# Patient Record
Sex: FEMALE | Race: WHITE | Employment: FULL TIME | URBAN - METROPOLITAN AREA
[De-identification: names, ages, dates, MRNs, and addresses within clinical notes are randomized per-mention and may not be internally consistent; named-entity substitution may affect disease eponyms.]

---

## 2021-10-20 ENCOUNTER — OFFICE VISIT (OUTPATIENT)
Dept: SURGERY | Age: 41
End: 2021-10-20
Payer: COMMERCIAL

## 2021-10-20 ENCOUNTER — HOSPITAL ENCOUNTER (OUTPATIENT)
Dept: LAB | Age: 41
Discharge: HOME | End: 2021-10-20

## 2021-10-20 VITALS
HEART RATE: 92 BPM | HEIGHT: 68 IN | BODY MASS INDEX: 23.34 KG/M2 | SYSTOLIC BLOOD PRESSURE: 113 MMHG | WEIGHT: 154 LBS | DIASTOLIC BLOOD PRESSURE: 71 MMHG

## 2021-10-20 DIAGNOSIS — D24.1 FIBROADENOMA OF BREAST, RIGHT: ICD-10-CM

## 2021-10-20 DIAGNOSIS — R92.8 ABNORMAL ULTRASOUND OF BREAST: ICD-10-CM

## 2021-10-20 DIAGNOSIS — N63.20 BREAST MASS, LEFT: Primary | ICD-10-CM

## 2021-10-20 DIAGNOSIS — D48.62 BORDERLINE FINDING OF PHYLLODES NEOPLASM OF BREAST, LEFT: ICD-10-CM

## 2021-10-20 DIAGNOSIS — R89.7 ABNORMAL BIOPSY RESULT: ICD-10-CM

## 2021-10-20 PROCEDURE — 76642 ULTRASOUND BREAST LIMITED: CPT | Performed by: SURGERY

## 2021-10-20 PROCEDURE — 19083 BX BREAST 1ST LESION US IMAG: CPT | Performed by: SURGERY

## 2021-10-20 NOTE — LETTER
10/22/2021 10:57 AM    Patient:  Ishaan Rogel   YOB: 1980  Date of Visit: 10/20/2021      Dear Cat: Thank you for referring Ms. Ishaan Rogel to me for evaluation/treatment. Below are the relevant portions of my assessment and plan of care. If you have questions, please do not hesitate to call me. I look forward to following Ms. Walsh along with you.         Sincerely,    Maday Hines MD

## 2021-10-20 NOTE — PROGRESS NOTES
HISTORY OF PRESENT ILLNESS  Eveline Alejandro is a 42 y.o. female. HPI  NEW patient consult referred by Kody Nolasco NP at [facility], for LEFT breast pain and discomfort. 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 She noticed LEFT nipple pain about a year ago, and the surgeon she saw thought it was related to a \"ripple\" or \"seam\" in the implant. Pt states she \"stopped sensing it\" after that. Pt also notes a RIGHT breast mass.     History:  10/2020 - RIGHT breast stereotactic biopsy, fibroadenoma. 5/13/2019 - LEFT mastectomy for borderline phyllodes tumor, with implant reconstruction. [place]. Family History: none      No past medical history on file. Past Surgical History:   Procedure Laterality Date    HX BREAST LUMPECTOMY  1995    bx. right breast fibroadenoma       Social History     Socioeconomic History    Marital status:      Spouse name: Not on file    Number of children: Not on file    Years of education: Not on file    Highest education level: Not on file   Occupational History    Not on file   Tobacco Use    Smoking status: Never Smoker    Smokeless tobacco: Never Used   Substance and Sexual Activity    Alcohol use: Yes     Alcohol/week: 4.2 standard drinks     Types: 2 Glasses of wine, 3 Cans of beer per week    Drug use: No    Sexual activity: Yes     Partners: Male     Birth control/protection: Condom   Other Topics Concern    Not on file   Social History Narrative    Not on file     Social Determinants of Health     Financial Resource Strain:     Difficulty of Paying Living Expenses:    Food Insecurity:     Worried About Running Out of Food in the Last Year:     [address] in the Last Year:    Transportation Needs:     Lack of Transportation (Medical):      Lack of Transportation (Non-Medical):    Physical Activity:     Days of Exercise per Week:     Minutes of Exercise per Session:    Stress:     Feeling of Stress :    Social Connections:     Frequency of Communication with Friends and Family:     Frequency of Social Gatherings with Friends and Family:     Attends Sikhism Services:     Active Member of Clubs or Organizations:     Attends Club or Organization Meetings:     Marital Status:    Intimate Partner Violence:     Fear of Current or Ex-Partner:     Emotionally Abused:     Physically Abused:     Sexually Abused:        No current outpatient medications on file prior to visit. No current facility-administered medications on file prior to visit. Not on File    OB History    No obstetric history on file. Obstetric Comments   Menarche 14, LMP current, # of children 2, age of 4st delivery 26, Hysterectomy/oophorectomy no/no, Breast bx yes, history of breast feeding yes, BCP yes, Hormone therapy no             Review of Systems   Constitutional: Negative. HENT: Negative. Eyes: Negative. Respiratory: Negative. Cardiovascular: Negative. Gastrointestinal: Negative. Genitourinary: Negative. Musculoskeletal: Negative. Skin: Negative. Neurological: Negative. Endo/Heme/Allergies: Negative. Psychiatric/Behavioral: Negative. Physical Exam  Exam conducted with a chaperone present. Cardiovascular:      Rate and Rhythm: Normal rate and regular rhythm. Heart sounds: Normal heart sounds. Pulmonary:      Breath sounds: Normal breath sounds. Chest:      Breasts: Breasts are symmetrical.         Right: Mass present. No swelling, bleeding, inverted nipple, nipple discharge, skin change or tenderness. Left: Mass present. No swelling, bleeding, inverted nipple, nipple discharge, skin change or tenderness. Lymphadenopathy:      Cervical:      Right cervical: No superficial, deep or posterior cervical adenopathy. Left cervical: No superficial, deep or posterior cervical adenopathy. Upper Body:      Right upper body: No supraclavicular or axillary adenopathy. Left upper body: No supraclavicular or axillary adenopathy. BREAST ULTRASOUND  Indication: BILATERAL breast masses. LEFT - 12:00, periareolar. RIGHT - 6:00. Technique: The area was scanned using a high-frequency linear-array near-field transducer  Findings: LEFT -  intact implant, with irregular hypoechoic mass above the implant. RIGHT - solid mass, no bx clip. Impression: LEFT - suspicious mass. RIGHT - concordant bx PATH (fibroadenoma). Disposition: LEFT ultrasound-guided biopsy performed      US-GUIDED CORE BIOPSY  Following detailed explanation and description of the biopsy procedure, its risks, benefits and possible alternatives, the patient signed the informed consent. Indication: Mass, Ultrasound Visible, LEFT breast 12:00, periareolar   Prep: We cleansed the skin with alcohol. Anesthesia: We anesthetized the skin and underlying tissues with 1% lidocaine with epinephrine. Device: We advanced the BARD Marquee device through the lesion and captured tissue with real-time ultrasound confirmation. Core Sampling: We repeated this sampling for the following number of cores, 3. Marker: We placed a marking clip to maximo the biopsy site. Marker Type: HydroMARK. Dressing: We then closed the incision with steristrips and placed a sterile dressing. Instructions: The patient was instructed regarding post-procedure care and activities. Pathology: Pending at this time. Patient tolerated procedure well and discharged in stable condition. Informed patient that they will be notified of pathology results in 3 to 5 days. ASSESSMENT and PLAN    ICD-10-CM ICD-9-CM    1. Breast mass, left  N63.20 611.72 SURGICAL PATHOLOGY   2. Abnormal ultrasound of breast  R92.8 793.89    3.  Fibroadenoma of breast, right  D24.1 1       New patient presents to establish care with a breast surgeon, and for evaluation of BL breast masses, and is doing well overall. Hypertrophic scar laterally in the LEFT breast s/p mastectomy with implant reconstruction, and palpable mass at 12:00 periareolar. US visualizes intact implant, with irregular hypoechoic mass above the implant at the 12:00 location. Discussed bx of this mass and pt agreed to proceed. She tolerated the procedure well, and 3 cores were taken, [facility] marker placed. Palpable mass on exam at RIGHT breast 6:00. US visualizes solid mass, no bx clip. Reviewed PATH report (pt had on her phone), which demonstrated fibroadenoma. Concordant bx results and US findings, no further intervention necessary. Will send LEFT breast bx specimens for PATH and f/u with results. This plan was reviewed with the patient and patient agrees. All questions were answered. Addendum:  Biopsy results are nonconcordant on left. Will get MRI given history of borderline phyllodes tumor    Total time spent was 40 minutes.     Written by Enrique Fletcher, as dictated by Dr. Stefan Duarte MD.

## 2021-10-20 NOTE — PATIENT INSTRUCTIONS
Ultrasound-Guided Breast Biopsy: About This Test  What is it? A breast biopsy removes a sample of breast tissue that is looked at under a microscope to check for breast cancer. Ultrasound is used to show an image of the breast tissue during the biopsy. This is called an ultrasound-guided breast biopsy. Why is this test done? A breast biopsy is most often done to check a lump found during a breast exam or a suspicious area found on a mammogram or other imaging. If there is a good chance that your doctor can get a sample without doing an open (surgical) biopsy, you can have a needle biopsy instead. For a needle breast biopsy, your doctor uses a needle to take a small sample of fluid or cells from the breast for testing. When the biopsy area isn't easy to find, the breast biopsy needle is usually guided with ultrasound. An ultrasound uses sound waves to make a picture of the inside of the breast. The sound waves create a picture on a video monitor. How do you prepare for the test?  If you take aspirin or some other blood thinner, ask your doctor if you should stop taking it before your test. Make sure that you understand exactly what your doctor wants you to do. These medicines increase the risk of bleeding. How is the test done? Ultrasound is used to guide the placement of the needle during the biopsy. · A warm gel will be spread on your breast.  · The ultrasound wand is pressed against your skin and gently moved around to find the place where the mammogram showed a problem. A picture of the breast can be seen on a video monitor. · A needle or tiny probe is put through your skin into your breast tissue. · If the lump is a cyst, the needle will take out fluid. If the lump is solid, the needle will take a sample of tissue. · The needle is removed and pressure is put on the needle site to stop any bleeding. The area is covered with a bandage. How does the test feel?   · Ultrasound is painless and does not use radiation. · You will feel only a quick sting from the needle if you have a local anesthetic to numb the biopsy area. You may feel some pressure when the biopsy needle is put in. How long does the test take? The test usually takes about 15 minutes. This depends on how many biopsy samples are needed. What happens after the test?  · You'll be told how long it may take to get your results back. · You will probably be able to go home right away. · After a specialist looks at the biopsy sample for signs of cancer, your doctor's office will let you know the results. · If the test results aren't clear, you may have another biopsy or test.  How can you care for yourself at home? · You can go back to your usual activities right away. But avoid heavy lifting for 24 hours. · The site may be tender for 2 or 3 days. You may also have some bruising, swelling, or slight bleeding. ? You can use an ice pack. Put ice or a cold pack on the area for 10 to 20 minutes at a time. Put a thin cloth between the ice and your skin. ? Ask your doctor if you can take an over-the-counter pain medicine, such as acetaminophen (Tylenol), ibuprofen (Advil, Motrin), or naproxen (Aleve). Be safe with medicines. Read and follow all instructions on the label. Follow-up care is a key part of your treatment and safety. Be sure to make and go to all appointments, and call your doctor if you are having problems. It's also a good idea to keep a list of the medicines you take. Ask your doctor when you can expect to have your test results. Where can you learn more? Go to http://www.gray.com/  Enter N959 in the search box to learn more about \"Ultrasound-Guided Breast Biopsy: About This Test.\"  Current as of: December 17, 2020               Content Version: 13.0  © 0281-7324 Healthwise, Incorporated.    Care instructions adapted under license by [employer] (which disclaims liability or warranty for this information). If you have questions about a medical condition or this instruction, always ask your healthcare professional. [place] [address] any warranty or liability for your use of this information.

## 2021-10-20 NOTE — PROGRESS NOTES
HISTORY OF PRESENT ILLNESS  Awa Holly is a 42 y.o. female. HPI NEW patient consult referred by PA-OB/GYN Viji Medeiros [facility],for LEFT breast pain/discomfort and lump in axillary area. Patient had Left breast mastectomy w/implant. Family History: none      Mammogram, ***, BIRADS ***  history  10/2020-Had RIGHT breast stereotactic biopsy   5/13/2019 Mastectomy  LEFT breast w/ implant     Review of Systems   All other systems reviewed and are negative.       Physical Exam    ASSESSMENT and PLAN  {ASSESSMENT/PLAN:26723}

## 2021-10-25 ENCOUNTER — TELEPHONE (OUTPATIENT)
Dept: SURGERY | Age: 41
End: 2021-10-25

## 2021-10-25 DIAGNOSIS — D48.62 PHYLLODES TUMOR, LEFT: Primary | ICD-10-CM

## 2021-12-14 ENCOUNTER — DOCUMENTATION ONLY (OUTPATIENT)
Dept: SURGERY | Age: 41
End: 2021-12-14

## 2021-12-14 NOTE — PROGRESS NOTES
[facility] from the billing ([phone]) department called and said that [address] denied the breast MRI. They flatly denied and said they will not pay for it. We will not be able to do a peer to peer.

## 2021-12-15 ENCOUNTER — TELEPHONE (OUTPATIENT)
Dept: SURGERY | Age: 41
End: 2021-12-15

## 2021-12-15 NOTE — TELEPHONE ENCOUNTER
Called patient with note that insurance denied MRI and we could not do a peer to peer. I have never heard of that and she has prepaid some of this already. She will call insurance and check into this.

## 2021-12-27 ENCOUNTER — HOSPITAL ENCOUNTER (OUTPATIENT)
Dept: MRI IMAGING | Age: 41
End: 2021-12-27
Attending: SURGERY

## 2022-01-17 ENCOUNTER — HOSPITAL ENCOUNTER (OUTPATIENT)
Dept: MRI IMAGING | Age: 42
Discharge: HOME | End: 2022-01-17
Attending: SURGERY
Payer: COMMERCIAL

## 2022-01-17 DIAGNOSIS — R89.7 ABNORMAL BIOPSY RESULT: ICD-10-CM

## 2022-01-17 DIAGNOSIS — D48.62 BORDERLINE FINDING OF PHYLLODES NEOPLASM OF BREAST, LEFT: ICD-10-CM

## 2022-01-17 DIAGNOSIS — N63.20 BREAST MASS, LEFT: ICD-10-CM

## 2022-01-17 DIAGNOSIS — R92.8 ABNORMAL ULTRASOUND OF BREAST: ICD-10-CM

## 2022-01-17 PROCEDURE — 74011000258 HC RX REV CODE- 258: Performed by: SURGERY

## 2022-01-17 PROCEDURE — 74011250636 HC RX REV CODE- 250/636

## 2022-01-17 PROCEDURE — A9576 INJ PROHANCE MULTIPACK: HCPCS

## 2022-01-17 PROCEDURE — 74011000250 HC RX REV CODE- 250: Performed by: SURGERY

## 2022-01-17 PROCEDURE — 77049 MRI BREAST C-+ W/CAD BI: CPT

## 2022-01-17 RX ORDER — SODIUM CHLORIDE 0.9 % (FLUSH) 0.9 %
10 SYRINGE (ML) INJECTION
Status: COMPLETED | OUTPATIENT
Start: 2022-01-17 | End: 2022-01-17

## 2022-01-17 RX ADMIN — SODIUM CHLORIDE, PRESERVATIVE FREE 10 ML: 5 INJECTION INTRAVENOUS at 15:00

## 2022-01-17 RX ADMIN — GADOTERIDOL 15 ML: 279.3 INJECTION, SOLUTION INTRAVENOUS at 15:00

## 2022-01-17 RX ADMIN — SODIUM CHLORIDE 100 ML: 900 INJECTION, SOLUTION INTRAVENOUS at 15:00

## 2022-01-18 ENCOUNTER — TELEPHONE (OUTPATIENT)
Dept: SURGERY | Age: 42
End: 2022-01-18

## 2022-01-27 ENCOUNTER — APPOINTMENT (OUTPATIENT)
Dept: MAMMOGRAPHY | Age: 42
End: 2022-01-27
Attending: SURGERY

## 2022-01-27 ENCOUNTER — APPOINTMENT (OUTPATIENT)
Dept: ULTRASOUND IMAGING | Age: 42
End: 2022-01-27
Attending: SURGERY

## 2022-02-04 ENCOUNTER — HOSPITAL ENCOUNTER (OUTPATIENT)
Dept: MAMMOGRAPHY | Age: 42
Discharge: HOME | End: 2022-02-04
Attending: SURGERY
Payer: COMMERCIAL

## 2022-02-04 DIAGNOSIS — R92.8 ABNORMAL MRI, BREAST: ICD-10-CM

## 2022-02-04 PROCEDURE — 77062 BREAST TOMOSYNTHESIS BI: CPT

## 2022-02-04 PROCEDURE — 76642 ULTRASOUND BREAST LIMITED: CPT

## 2022-02-04 PROCEDURE — 77061 BREAST TOMOSYNTHESIS UNI: CPT

## 2023-03-14 ENCOUNTER — OFFICE VISIT (OUTPATIENT)
Dept: FAMILY MEDICINE CLINIC | Age: 43
End: 2023-03-14
Payer: COMMERCIAL

## 2023-03-14 VITALS
TEMPERATURE: 97.7 F | RESPIRATION RATE: 16 BRPM | OXYGEN SATURATION: 99 % | WEIGHT: 155.4 LBS | BODY MASS INDEX: 23.55 KG/M2 | HEART RATE: 66 BPM | HEIGHT: 68 IN | DIASTOLIC BLOOD PRESSURE: 67 MMHG | SYSTOLIC BLOOD PRESSURE: 105 MMHG

## 2023-03-14 DIAGNOSIS — Z11.59 ENCOUNTER FOR HEPATITIS C SCREENING TEST FOR LOW RISK PATIENT: ICD-10-CM

## 2023-03-14 DIAGNOSIS — Z13.220 SCREENING, LIPID: ICD-10-CM

## 2023-03-14 DIAGNOSIS — Z00.00 ROUTINE GENERAL MEDICAL EXAMINATION AT A HEALTH CARE FACILITY: Primary | ICD-10-CM

## 2023-03-14 PROCEDURE — 99386 PREV VISIT NEW AGE 40-64: CPT | Performed by: NURSE PRACTITIONER

## 2023-03-14 RX ORDER — LEVONORGESTREL 52 MG/1
INTRAUTERINE DEVICE INTRAUTERINE
COMMUNITY
Start: 2021-11-17

## 2023-03-14 RX ORDER — CETIRIZINE HYDROCHLORIDE 10 MG/1
TABLET ORAL
COMMUNITY

## 2023-03-14 RX ORDER — IBUPROFEN 600 MG/1
TABLET ORAL
COMMUNITY
Start: 2022-12-22

## 2023-03-14 NOTE — PROGRESS NOTES
Chief Complaint   Patient presents with    Establish Care       1. Have you been to the ER, urgent care clinic since your last visit? Hospitalized since your last visit? No    2. Have you seen or consulted any other health care providers outside of the [address] [place] [address] since your last visit? Include any pap smears or colon screening. Yes When: 1/2023 Where: [facility]    3. For patients over 45: Has the patient had a colonoscopy? NA - based on age     If the patient is female:    4. For patients over 40: Has the patient had a mammogram? Yes - no Care Gap present    5. For patients over 21: Has the patient had a pap smear? Yes - Care Gap present. Rooming MA/LPN to request most recent results, [facility]    No flowsheet data found. Learning Assessment 10/20/2021   PRIMARY LEARNER Patient   PRIMARY LANGUAGE ENGLISH   LEARNER PREFERENCE PRIMARY DEMONSTRATION   ANSWERED BY patient   RELATIONSHIP SELF       Abuse Screening Questionnaire 3/14/2023   Do you ever feel afraid of your partner? N   Are you in a relationship with someone who physically or mentally threatens you? N   Is it safe for you to go home?  Y     Health Maintenance Due   Topic Date Due    Hepatitis C Screening  Never done    Depression Screen  Never done    DTaP/Tdap/Td series (1 - Tdap) Never done    Cervical cancer screen  Never done    Lipid Screen  Never done    COVID-19 Vaccine (2 - Pfizer series) 07/22/2021    Flu Vaccine (1) Never done

## 2023-03-14 NOTE — PROGRESS NOTES
Assessment/ Plan:   Full age appropriate History and Physical exam as well as health care maintenance  performed and discussed today. Risk factor modification discussed today includes safe sex practices, healthy diet and exercise, and seat belt use. Continue current medications. Diagnoses and all orders for this visit:    1. Routine general medical examination at a health care facility  -     CBC WITH AUTOMATED DIFF; Future  -     METABOLIC PANEL, COMPREHENSIVE; Future  -     TSH 3RD GENERATION; Future    2. Screening, lipid  -     LIPID PANEL; Future    3. Encounter for hepatitis C screening test for low risk patient  -     HEPATITIS C AB; Future      Follow-up and Dispositions    Return if symptoms worsen or fail to improve. Discussed expected course/resolution/complications of diagnosis in detail with patient. Medication risks/benefits/costs/interactions/alternatives discussed with patient. Pt was given after visit summary which includes diagnoses, current medications & vitals. Pt expressed understanding with the diagnosis and plan      HPI:   Uriel Kebede is a 39 y.o. female who presents for annual exam and to establish care. [place] many [place] and now in [place] for the past year. She is a teacher. She is . She has two children. She is followed by [address] [place] [address]. She is up to date on mammogram and pap smear. She plans to initiate marital counseling through her EPA soon. She reports a healthy diet. She is routinely active. Current Outpatient Medications   Medication Sig Dispense Refill    levonorgestreL (Mirena) 21 mcg/24 hours (8 yrs) 52 mg IUD Mirena 20 mcg/24 hours (7 yrs) 52 mg intrauterine device   Take by intrauterine route. ibuprofen (MOTRIN) 600 mg tablet TAKE 1 TABLET BY MOUTH EVERY 6 HOURS AS NEEDED      cetirizine (ZyrTEC) 10 mg tablet Take  by mouth.         Not on File   There is no problem list on file for this patient. History reviewed. No pertinent past medical history. Past Surgical History:   Procedure Laterality Date    HX BREAST BIOPSY      HX BREAST LUMPECTOMY        No LMP recorded (lmp unknown). Family History   Problem Relation Age of Onset    Lung Cancer Mother 64        smoker    Heart Attack Father 59    Diabetes Paternal Grandmother     Heart Attack Paternal Grandmother     Kidney Disease Paternal Grandmother     Heart Attack Paternal Grandfather     Lung Cancer Maternal Uncle         smoker    No Known Problems Son     No Known Problems Daughter       Social History     Socioeconomic History    Marital status:      Spouse name: Not on file    Number of children: Not on file    Years of education: Not on file    Highest education level: Not on file   Occupational History    Not on file   Tobacco Use    Smoking status: Never    Smokeless tobacco: Never   Substance and Sexual Activity    Alcohol use: Yes     Alcohol/week: 4.2 standard drinks     Types: 2 Glasses of wine, 3 Cans of beer per week    Drug use: No    Sexual activity: Yes     Partners: Male     Birth control/protection: I.U.D.    Other Topics Concern    Not on file   Social History Narrative    Not on file     Social Determinants of Health     Financial Resource Strain: Low Risk     Difficulty of Paying Living Expenses: Not hard at all   Food Insecurity: No Food Insecurity    Worried About Running Out of Food in the Last Year: Never true    Ran Out of Food in the Last Year: Never true   Transportation Needs: Not on file   Physical Activity: Sufficiently Active    Days of Exercise per Week: 5 days    Minutes of Exercise per Session: 30 min   Stress: Not on file   Social Connections: Not on file   Intimate Partner Violence: Not At Risk    Fear of Current or Ex-Partner: No    Emotionally Abused: No    Physically Abused: No    Sexually Abused: No   Housing Stability: Not on file        ROS:     Review of Systems Constitutional:  Negative for fever, malaise/fatigue and weight loss. HENT:  Negative for hearing loss. Eyes:  Negative for blurred vision and pain. Respiratory:  Negative for cough and shortness of breath. Cardiovascular:  Negative for chest pain, palpitations and leg swelling. Gastrointestinal:  Negative for abdominal pain, blood in stool, constipation, diarrhea and melena. Genitourinary:  Negative for dysuria and hematuria. Musculoskeletal:  Negative for joint pain. Skin:  Negative for rash. Neurological:  Negative for headaches. Psychiatric/Behavioral:  Negative for depression. The patient is not nervous/anxious and does not have insomnia. Physical Exam:   Visit Vitals  /67 (BP 1 Location: Left upper arm, BP Patient Position: Sitting, BP Cuff Size: Large adult)   Pulse 66   Temp 97.7 °F (36.5 °C)   Resp 16   Ht 5' 8\" (1.727 m)   Wt 155 lb 6.4 oz (70.5 kg)   LMP  (LMP Unknown)   SpO2 99%   BMI 23.63 kg/m²        Nursing Documentation and Vital Signs Reviewed. Physical Exam  Constitutional:       General: She is not in acute distress. HENT:      Right Ear: No drainage. No middle ear effusion. Tympanic membrane is not injected, erythematous or bulging. Left Ear: No drainage. No middle ear effusion. Tympanic membrane is not injected, erythematous or bulging. Eyes:      Pupils: Pupils are equal, round, and reactive to light. Neck:      Trachea: No tracheal deviation. Cardiovascular:      Pulses:           Dorsalis pedis pulses are 2+ on the right side and 2+ on the left side. Posterior tibial pulses are 2+ on the right side and 2+ on the left side. Heart sounds: S1 normal and S2 normal. No murmur heard. No friction rub. No gallop. Pulmonary:      Breath sounds: Normal breath sounds. No wheezing. Abdominal:      General: Bowel sounds are normal. There is no distension. Palpations: Abdomen is soft. There is no mass. Tenderness:  There is no abdominal tenderness. Lymphadenopathy:      Cervical: No cervical adenopathy. Skin:     General: Skin is warm and dry. Neurological:      Cranial Nerves: No cranial nerve deficit. Sensory: Sensation is intact. Motor: Motor function is intact.

## 2023-03-15 LAB
ALBUMIN SERPL-MCNC: 5 G/DL (ref 3.8–4.8)
ALBUMIN/GLOB SERPL: 2.3 {RATIO} (ref 1.2–2.2)
ALP SERPL-CCNC: 89 IU/L (ref 44–121)
ALT SERPL-CCNC: 17 IU/L (ref 0–32)
AST SERPL-CCNC: 19 IU/L (ref 0–40)
BASOPHILS # BLD AUTO: 0 X10E3/UL (ref 0–0.2)
BASOPHILS NFR BLD AUTO: 1 %
BILIRUB SERPL-MCNC: 0.4 MG/DL (ref 0–1.2)
BUN SERPL-MCNC: 7 MG/DL (ref 6–24)
BUN/CREAT SERPL: 10 (ref 9–23)
CALCIUM SERPL-MCNC: 9.5 MG/DL (ref 8.7–10.2)
CHLORIDE SERPL-SCNC: 104 MMOL/L (ref 96–106)
CHOLEST SERPL-MCNC: 199 MG/DL (ref 100–199)
CO2 SERPL-SCNC: 27 MMOL/L (ref 20–29)
CREAT SERPL-MCNC: 0.71 MG/DL (ref 0.57–1)
EGFRCR SERPLBLD CKD-EPI 2021: 107 ML/MIN/1.73
EOSINOPHIL # BLD AUTO: 0.1 X10E3/UL (ref 0–0.4)
EOSINOPHIL NFR BLD AUTO: 2 %
ERYTHROCYTE [DISTWIDTH] IN BLOOD BY AUTOMATED COUNT: 12.3 % (ref 11.7–15.4)
GLOBULIN SER CALC-MCNC: 2.2 G/DL (ref 1.5–4.5)
GLUCOSE SERPL-MCNC: 90 MG/DL (ref 70–99)
HCT VFR BLD AUTO: 42.1 % (ref 34–46.6)
HCV IGG SERPL QL IA: NON REACTIVE
HDLC SERPL-MCNC: 47 MG/DL
HGB BLD-MCNC: 13.8 G/DL (ref 11.1–15.9)
IMM GRANULOCYTES # BLD AUTO: 0 X10E3/UL (ref 0–0.1)
IMM GRANULOCYTES NFR BLD AUTO: 0 %
IMP & REVIEW OF LAB RESULTS: NORMAL
LDLC SERPL CALC-MCNC: 134 MG/DL (ref 0–99)
LYMPHOCYTES # BLD AUTO: 1.4 X10E3/UL (ref 0.7–3.1)
LYMPHOCYTES NFR BLD AUTO: 25 %
MCH RBC QN AUTO: 31.2 PG (ref 26.6–33)
MCHC RBC AUTO-ENTMCNC: 32.8 G/DL (ref 31.5–35.7)
MCV RBC AUTO: 95 FL (ref 79–97)
MONOCYTES # BLD AUTO: 0.4 X10E3/UL (ref 0.1–0.9)
MONOCYTES NFR BLD AUTO: 6 %
NEUTROPHILS # BLD AUTO: 3.6 X10E3/UL (ref 1.4–7)
NEUTROPHILS NFR BLD AUTO: 66 %
PLATELET # BLD AUTO: 297 X10E3/UL (ref 150–450)
POTASSIUM SERPL-SCNC: 4.8 MMOL/L (ref 3.5–5.2)
PROT SERPL-MCNC: 7.2 G/DL (ref 6–8.5)
RBC # BLD AUTO: 4.43 X10E6/UL (ref 3.77–5.28)
SODIUM SERPL-SCNC: 147 MMOL/L (ref 134–144)
TRIGL SERPL-MCNC: 101 MG/DL (ref 0–149)
TSH SERPL DL<=0.005 MIU/L-ACNC: 2.05 UIU/ML (ref 0.45–4.5)
VLDLC SERPL CALC-MCNC: 18 MG/DL (ref 5–40)
WBC # BLD AUTO: 5.4 X10E3/UL (ref 3.4–10.8)

## 2023-09-18 ENCOUNTER — TELEPHONE (OUTPATIENT)
Age: 43
End: 2023-09-18

## 2023-09-18 DIAGNOSIS — E55.9 VITAMIN D DEFICIENCY: Primary | ICD-10-CM

## 2023-09-18 NOTE — TELEPHONE ENCOUNTER
Patient called stating that she is ready for her Vitamin D levels to be checked again, and is hoping NP Michell could order the labs for this to be done. Patient is able to come in today to have this, and is just needing those orders. Patient would like a call back when Regine Bell has been completed.     Best call back number  [phone]

## 2023-09-22 NOTE — TELEPHONE ENCOUNTER
Left detailed message to callback the office. When pt calls back please schedule her for a Lab Only Appt.

## 2023-10-30 ENCOUNTER — NURSE ONLY (OUTPATIENT)
Age: 43
End: 2023-10-30

## 2023-10-30 DIAGNOSIS — E55.9 VITAMIN D DEFICIENCY: ICD-10-CM

## 2023-10-31 LAB — 25(OH)D3 SERPL-MCNC: 29.3 NG/ML (ref 30–100)

## 2023-12-14 ENCOUNTER — OFFICE VISIT (OUTPATIENT)
Age: 43
End: 2023-12-14
Payer: COMMERCIAL

## 2023-12-14 VITALS
RESPIRATION RATE: 16 BRPM | TEMPERATURE: 98.2 F | HEIGHT: 68 IN | WEIGHT: 159.4 LBS | HEART RATE: 78 BPM | SYSTOLIC BLOOD PRESSURE: 100 MMHG | BODY MASS INDEX: 24.16 KG/M2 | OXYGEN SATURATION: 100 % | DIASTOLIC BLOOD PRESSURE: 67 MMHG

## 2023-12-14 DIAGNOSIS — E55.9 VITAMIN D DEFICIENCY: ICD-10-CM

## 2023-12-14 DIAGNOSIS — Z00.00 ROUTINE GENERAL MEDICAL EXAMINATION AT A HEALTH CARE FACILITY: ICD-10-CM

## 2023-12-14 DIAGNOSIS — Z13.220 SCREENING, LIPID: ICD-10-CM

## 2023-12-14 DIAGNOSIS — Z00.00 ROUTINE GENERAL MEDICAL EXAMINATION AT A HEALTH CARE FACILITY: Primary | ICD-10-CM

## 2023-12-14 DIAGNOSIS — Z12.11 ENCOUNTER FOR SCREENING COLONOSCOPY: ICD-10-CM

## 2023-12-14 PROCEDURE — 99396 PREV VISIT EST AGE 40-64: CPT | Performed by: NURSE PRACTITIONER

## 2023-12-14 SDOH — ECONOMIC STABILITY: INCOME INSECURITY: HOW HARD IS IT FOR YOU TO PAY FOR THE VERY BASICS LIKE FOOD, HOUSING, MEDICAL CARE, AND HEATING?: NOT HARD AT ALL

## 2023-12-14 SDOH — ECONOMIC STABILITY: HOUSING INSECURITY
IN THE LAST 12 MONTHS, WAS THERE A TIME WHEN YOU DID NOT HAVE A STEADY PLACE TO SLEEP OR SLEPT IN A SHELTER (INCLUDING NOW)?: NO

## 2023-12-14 SDOH — ECONOMIC STABILITY: FOOD INSECURITY: WITHIN THE PAST 12 MONTHS, THE FOOD YOU BOUGHT JUST DIDN'T LAST AND YOU DIDN'T HAVE MONEY TO GET MORE.: NEVER TRUE

## 2023-12-14 SDOH — ECONOMIC STABILITY: FOOD INSECURITY: WITHIN THE PAST 12 MONTHS, YOU WORRIED THAT YOUR FOOD WOULD RUN OUT BEFORE YOU GOT MONEY TO BUY MORE.: NEVER TRUE

## 2023-12-21 LAB
25(OH)D3+25(OH)D2 SERPL-MCNC: 43.3 NG/ML (ref 30–100)
ALBUMIN SERPL-MCNC: 4.5 G/DL (ref 3.9–4.9)
ALBUMIN/GLOB SERPL: 2 {RATIO} (ref 1.2–2.2)
ALP SERPL-CCNC: 72 IU/L (ref 44–121)
ALT SERPL-CCNC: 18 IU/L (ref 0–32)
AST SERPL-CCNC: 23 IU/L (ref 0–40)
BASOPHILS # BLD AUTO: 0 X10E3/UL (ref 0–0.2)
BASOPHILS NFR BLD AUTO: 1 %
BILIRUB SERPL-MCNC: 0.5 MG/DL (ref 0–1.2)
BUN SERPL-MCNC: 5 MG/DL (ref 6–24)
BUN/CREAT SERPL: 7 (ref 9–23)
CALCIUM SERPL-MCNC: 9.2 MG/DL (ref 8.7–10.2)
CHLORIDE SERPL-SCNC: 98 MMOL/L (ref 96–106)
CHOLEST SERPL-MCNC: 167 MG/DL (ref 100–199)
CO2 SERPL-SCNC: 22 MMOL/L (ref 20–29)
CREAT SERPL-MCNC: 0.67 MG/DL (ref 0.57–1)
EGFRCR SERPLBLD CKD-EPI 2021: 110 ML/MIN/1.73
EOSINOPHIL # BLD AUTO: 0.1 X10E3/UL (ref 0–0.4)
EOSINOPHIL NFR BLD AUTO: 1 %
ERYTHROCYTE [DISTWIDTH] IN BLOOD BY AUTOMATED COUNT: 11.9 % (ref 11.7–15.4)
GLOBULIN SER CALC-MCNC: 2.2 G/DL (ref 1.5–4.5)
GLUCOSE SERPL-MCNC: 46 MG/DL (ref 70–99)
HCT VFR BLD AUTO: 38.3 % (ref 34–46.6)
HDLC SERPL-MCNC: 43 MG/DL
HGB BLD-MCNC: 12.5 G/DL (ref 11.1–15.9)
IMM GRANULOCYTES # BLD AUTO: 0 X10E3/UL (ref 0–0.1)
IMM GRANULOCYTES NFR BLD AUTO: 0 %
IMP & REVIEW OF LAB RESULTS: NORMAL
LDLC SERPL CALC-MCNC: 110 MG/DL (ref 0–99)
LYMPHOCYTES # BLD AUTO: 1.3 X10E3/UL (ref 0.7–3.1)
LYMPHOCYTES NFR BLD AUTO: 19 %
MCH RBC QN AUTO: 31.6 PG (ref 26.6–33)
MCHC RBC AUTO-ENTMCNC: 32.6 G/DL (ref 31.5–35.7)
MCV RBC AUTO: 97 FL (ref 79–97)
MONOCYTES # BLD AUTO: 0.4 X10E3/UL (ref 0.1–0.9)
MONOCYTES NFR BLD AUTO: 6 %
NEUTROPHILS # BLD AUTO: 5 X10E3/UL (ref 1.4–7)
NEUTROPHILS NFR BLD AUTO: 73 %
PLATELET # BLD AUTO: 282 X10E3/UL (ref 150–450)
POTASSIUM SERPL-SCNC: 4.2 MMOL/L (ref 3.5–5.2)
PROT SERPL-MCNC: 6.7 G/DL (ref 6–8.5)
RBC # BLD AUTO: 3.95 X10E6/UL (ref 3.77–5.28)
SODIUM SERPL-SCNC: 138 MMOL/L (ref 134–144)
TRIGL SERPL-MCNC: 72 MG/DL (ref 0–149)
TSH SERPL DL<=0.005 MIU/L-ACNC: 2.28 UIU/ML (ref 0.45–4.5)
VLDLC SERPL CALC-MCNC: 14 MG/DL (ref 5–40)
WBC # BLD AUTO: 6.8 X10E3/UL (ref 3.4–10.8)

## 2024-09-16 ENCOUNTER — OFFICE VISIT (OUTPATIENT)
Age: 44
End: 2024-09-16
Payer: COMMERCIAL

## 2024-09-16 VITALS — WEIGHT: 165 LBS | BODY MASS INDEX: 24.44 KG/M2 | HEIGHT: 69 IN

## 2024-09-16 DIAGNOSIS — N60.01 BENIGN CYST OF RIGHT BREAST: Primary | ICD-10-CM

## 2024-09-16 PROCEDURE — 99203 OFFICE O/P NEW LOW 30 MIN: CPT | Performed by: SURGERY

## 2024-09-16 PROCEDURE — 19000 PUNCTURE ASPIR CYST BREAST: CPT | Performed by: SURGERY

## 2024-09-16 PROCEDURE — 76642 ULTRASOUND BREAST LIMITED: CPT | Performed by: SURGERY

## 2025-01-02 SDOH — HEALTH STABILITY: PHYSICAL HEALTH: ON AVERAGE, HOW MANY DAYS PER WEEK DO YOU ENGAGE IN MODERATE TO STRENUOUS EXERCISE (LIKE A BRISK WALK)?: 4 DAYS

## 2025-01-02 SDOH — HEALTH STABILITY: PHYSICAL HEALTH: ON AVERAGE, HOW MANY MINUTES DO YOU ENGAGE IN EXERCISE AT THIS LEVEL?: 30 MIN

## 2025-01-03 ENCOUNTER — OFFICE VISIT (OUTPATIENT)
Facility: CLINIC | Age: 45
End: 2025-01-03
Payer: COMMERCIAL

## 2025-01-03 VITALS
BODY MASS INDEX: 25.68 KG/M2 | HEART RATE: 67 BPM | RESPIRATION RATE: 14 BRPM | DIASTOLIC BLOOD PRESSURE: 75 MMHG | OXYGEN SATURATION: 97 % | SYSTOLIC BLOOD PRESSURE: 124 MMHG | HEIGHT: 69 IN | WEIGHT: 173.4 LBS

## 2025-01-03 DIAGNOSIS — R00.2 HEART PALPITATIONS: Primary | ICD-10-CM

## 2025-01-03 DIAGNOSIS — R00.2 PALPITATIONS: ICD-10-CM

## 2025-01-03 DIAGNOSIS — E55.9 VITAMIN D DEFICIENCY: ICD-10-CM

## 2025-01-03 PROBLEM — K64.9 HEMORRHOIDS: Status: ACTIVE | Noted: 2025-01-03

## 2025-01-03 PROBLEM — K57.30 DIVERTICULOSIS OF LARGE INTESTINE WITHOUT PERFORATION OR ABSCESS WITHOUT BLEEDING: Status: RESOLVED | Noted: 2024-07-30 | Resolved: 2025-01-03

## 2025-01-03 PROBLEM — K64.9 HEMORRHOIDS: Status: RESOLVED | Noted: 2025-01-03 | Resolved: 2025-01-03

## 2025-01-03 PROBLEM — K57.30 DIVERTICULOSIS OF LARGE INTESTINE WITHOUT PERFORATION OR ABSCESS WITHOUT BLEEDING: Status: ACTIVE | Noted: 2024-07-30

## 2025-01-03 PROCEDURE — 93000 ELECTROCARDIOGRAM COMPLETE: CPT | Performed by: INTERNAL MEDICINE

## 2025-01-03 PROCEDURE — 99204 OFFICE O/P NEW MOD 45 MIN: CPT | Performed by: INTERNAL MEDICINE

## 2025-01-03 ASSESSMENT — PATIENT HEALTH QUESTIONNAIRE - PHQ9
2. FEELING DOWN, DEPRESSED OR HOPELESS: NOT AT ALL
SUM OF ALL RESPONSES TO PHQ QUESTIONS 1-9: 0
1. LITTLE INTEREST OR PLEASURE IN DOING THINGS: NOT AT ALL
SUM OF ALL RESPONSES TO PHQ QUESTIONS 1-9: 0
SUM OF ALL RESPONSES TO PHQ9 QUESTIONS 1 & 2: 0

## 2025-01-03 NOTE — PATIENT INSTRUCTIONS
If you have not been contacted within 72 hours for scheduling your heart monitor, please call [phone].

## 2025-01-03 NOTE — PROGRESS NOTES
BREAST LUMPECTOMY            Family History   Problem Relation Age of Onset    Lung Cancer Mother 56        smoker    Alcohol Abuse Mother     Depression Mother     Substance Abuse Mother     Heart Attack Father 64    Gout Maternal Grandmother     Hypertension Maternal Grandmother     Heart Attack Maternal Grandmother     Diabetes Paternal Grandmother         Type 2    Heart Disease Paternal Grandmother         CABG    Kidney Disease Paternal Grandmother     Heart Attack Paternal Grandfather     Stroke Paternal Grandfather     No Known Problems Daughter     No Known Problems Son     Diabetes Maternal Aunt         Type 1    Lung Cancer Maternal Uncle         smoker    Diabetes Paternal Aunt         Type 1    Depression Maternal Cousin         committed suicide due to depression          Social History     Tobacco Use    Smoking status: Never    Smokeless tobacco: Never   Substance Use Topics    Alcohol use: Yes     Alcohol/week: 4.2 standard drinks of alcohol     Comment: I probably have 1 glass of wine every 3-4 months. It is rare    Drug use: No          Current Outpatient Medications:     Vitamin D-Vitamin K (D3 + K2 PO), Take 2,000 Units by mouth, Disp: , Rfl:     cetirizine (ZYRTEC) 10 MG tablet, Take by mouth, Disp: , Rfl:     ibuprofen (ADVIL;MOTRIN) 600 MG tablet, TAKE 1 TABLET BY MOUTH EVERY 6 HOURS AS NEEDED, Disp: , Rfl:     levonorgestrel (MIRENA, 52 MG,) IUD 52 mg, Mirena 20 mcg/24 hours (7 yrs) 52 mg intrauterine device  Take by intrauterine route., Disp: , Rfl:     No Known Allergies     /75 (Site: Left Upper Arm, Position: Sitting, Cuff Size: Small Adult)   Pulse 67   Resp 14   Ht 1.74 m (5' 8.5\")   Wt 78.7 kg (173 lb 6.4 oz)   LMP  (LMP Unknown)   SpO2 97%   Breastfeeding No   BMI 25.98 kg/m²      General: well nourished, well appearing, in no distress  Neck: no thyromegaly or masses  Heart: regular, normal rate, no murmurs noted, distal pulses 2+ bilaterally, no pitting peripheral

## 2025-01-04 LAB
25(OH)D3+25(OH)D2 SERPL-MCNC: 27.7 NG/ML (ref 30–100)
ALBUMIN SERPL-MCNC: 4.9 G/DL (ref 3.9–4.9)
ALP SERPL-CCNC: 76 IU/L (ref 44–121)
ALT SERPL-CCNC: 19 IU/L (ref 0–32)
AST SERPL-CCNC: 22 IU/L (ref 0–40)
BASOPHILS # BLD AUTO: 0 X10E3/UL (ref 0–0.2)
BASOPHILS NFR BLD AUTO: 0 %
BILIRUB SERPL-MCNC: 0.2 MG/DL (ref 0–1.2)
BUN SERPL-MCNC: 10 MG/DL (ref 6–24)
BUN/CREAT SERPL: 15 (ref 9–23)
CALCIUM SERPL-MCNC: 10.3 MG/DL (ref 8.7–10.2)
CHLORIDE SERPL-SCNC: 101 MMOL/L (ref 96–106)
CO2 SERPL-SCNC: 27 MMOL/L (ref 20–29)
CREAT SERPL-MCNC: 0.65 MG/DL (ref 0.57–1)
EGFRCR SERPLBLD CKD-EPI 2021: 110 ML/MIN/1.73
EOSINOPHIL # BLD AUTO: 0.1 X10E3/UL (ref 0–0.4)
EOSINOPHIL NFR BLD AUTO: 1 %
ERYTHROCYTE [DISTWIDTH] IN BLOOD BY AUTOMATED COUNT: 12.3 % (ref 11.7–15.4)
GLOBULIN SER CALC-MCNC: 2.6 G/DL (ref 1.5–4.5)
GLUCOSE SERPL-MCNC: 89 MG/DL (ref 70–99)
HCT VFR BLD AUTO: 43.3 % (ref 34–46.6)
HGB BLD-MCNC: 14 G/DL (ref 11.1–15.9)
IMM GRANULOCYTES # BLD AUTO: 0 X10E3/UL (ref 0–0.1)
IMM GRANULOCYTES NFR BLD AUTO: 0 %
LYMPHOCYTES # BLD AUTO: 1.3 X10E3/UL (ref 0.7–3.1)
LYMPHOCYTES NFR BLD AUTO: 19 %
MAGNESIUM SERPL-MCNC: 2.1 MG/DL (ref 1.6–2.3)
MCH RBC QN AUTO: 30.3 PG (ref 26.6–33)
MCHC RBC AUTO-ENTMCNC: 32.3 G/DL (ref 31.5–35.7)
MCV RBC AUTO: 94 FL (ref 79–97)
MONOCYTES # BLD AUTO: 0.4 X10E3/UL (ref 0.1–0.9)
MONOCYTES NFR BLD AUTO: 6 %
NEUTROPHILS # BLD AUTO: 4.9 X10E3/UL (ref 1.4–7)
NEUTROPHILS NFR BLD AUTO: 74 %
PLATELET # BLD AUTO: 345 X10E3/UL (ref 150–450)
POTASSIUM SERPL-SCNC: 4.2 MMOL/L (ref 3.5–5.2)
PROT SERPL-MCNC: 7.5 G/DL (ref 6–8.5)
RBC # BLD AUTO: 4.62 X10E6/UL (ref 3.77–5.28)
SODIUM SERPL-SCNC: 140 MMOL/L (ref 134–144)
TSH SERPL DL<=0.005 MIU/L-ACNC: 2.4 UIU/ML (ref 0.45–4.5)
WBC # BLD AUTO: 6.7 X10E3/UL (ref 3.4–10.8)

## 2025-01-13 ENCOUNTER — PATIENT MESSAGE (OUTPATIENT)
Facility: CLINIC | Age: 45
End: 2025-01-13

## 2025-01-15 ENCOUNTER — HOSPITAL ENCOUNTER (OUTPATIENT)
Facility: HOSPITAL | Age: 45
Discharge: HOME | End: 2025-01-17
Attending: INTERNAL MEDICINE
Payer: COMMERCIAL

## 2025-01-15 DIAGNOSIS — R00.2 HEART PALPITATIONS: ICD-10-CM

## 2025-01-15 DIAGNOSIS — R00.2 PALPITATIONS: ICD-10-CM

## 2025-01-15 PROCEDURE — 93246 EXT ECG>7D<15D RECORDING: CPT

## 2025-06-23 ENCOUNTER — OFFICE VISIT (OUTPATIENT)
Age: 45
End: 2025-06-23

## 2025-06-23 VITALS
DIASTOLIC BLOOD PRESSURE: 79 MMHG | HEIGHT: 68 IN | HEART RATE: 78 BPM | TEMPERATURE: 98.2 F | RESPIRATION RATE: 18 BRPM | OXYGEN SATURATION: 97 % | WEIGHT: 176.2 LBS | SYSTOLIC BLOOD PRESSURE: 118 MMHG | BODY MASS INDEX: 26.7 KG/M2

## 2025-06-23 DIAGNOSIS — B02.9 HERPES ZOSTER WITHOUT COMPLICATION: Primary | ICD-10-CM

## 2025-06-23 DIAGNOSIS — T14.8XXA MUSCLE STRAIN: ICD-10-CM

## 2025-06-23 RX ORDER — CYCLOBENZAPRINE HCL 5 MG
5 TABLET ORAL 2 TIMES DAILY PRN
Qty: 10 TABLET | Refills: 0 | Status: SHIPPED | OUTPATIENT
Start: 2025-06-23 | End: 2025-06-23

## 2025-06-23 RX ORDER — CYCLOBENZAPRINE HCL 5 MG
5 TABLET ORAL NIGHTLY
Qty: 7 TABLET | Refills: 0 | Status: SHIPPED | OUTPATIENT
Start: 2025-06-23 | End: 2025-06-30

## 2025-06-23 ASSESSMENT — ENCOUNTER SYMPTOMS: BACK PAIN: 1

## 2025-06-23 NOTE — PATIENT INSTRUCTIONS
Thank you for visiting [facility] today.     Take valacyclovir as prescribed    A survey will be sent shortly to your phone/email.  Please complete this so we may know how to better serve you in the future.     If you begin to have shortness of breath, chest pain or uncontrollable fever of 100.4 or greater, please go to the Emergency Room.

## 2025-08-14 ENCOUNTER — OFFICE VISIT (OUTPATIENT)
Age: 45
End: 2025-08-14

## 2025-08-14 VITALS — RESPIRATION RATE: 18 BRPM | BODY MASS INDEX: 27.43 KG/M2 | WEIGHT: 181 LBS | HEIGHT: 68 IN

## 2025-08-14 DIAGNOSIS — Z53.8 APPOINTMENT CANCELED BY HOSPITAL: Primary | ICD-10-CM
